# Patient Record
Sex: MALE | Race: WHITE
[De-identification: names, ages, dates, MRNs, and addresses within clinical notes are randomized per-mention and may not be internally consistent; named-entity substitution may affect disease eponyms.]

---

## 2017-03-22 ENCOUNTER — HOSPITAL ENCOUNTER (EMERGENCY)
Dept: HOSPITAL 1 - ED | Age: 37
Discharge: HOME | End: 2017-03-22
Payer: COMMERCIAL

## 2017-03-22 VITALS — DIASTOLIC BLOOD PRESSURE: 75 MMHG | SYSTOLIC BLOOD PRESSURE: 141 MMHG

## 2017-03-22 VITALS — HEIGHT: 68 IN | WEIGHT: 227.98 LBS | BODY MASS INDEX: 34.55 KG/M2

## 2017-03-22 DIAGNOSIS — R10.9: Primary | ICD-10-CM

## 2017-03-22 DIAGNOSIS — R19.7: ICD-10-CM

## 2017-03-22 LAB
ALBUMIN SERPL-MCNC: 3.6 G/DL (ref 3.4–5)
ALP SERPL-CCNC: 71 U/L (ref 46–116)
ALT SERPL-CCNC: 63 U/L (ref 16–63)
AST SERPL-CCNC: 97 U/L (ref 15–37)
BASOPHILS NFR BLD: 0.1 % (ref 0–2)
BILIRUB SERPL-MCNC: 1.4 MG/DL (ref 0.2–1)
BUN SERPL-MCNC: 14 MG/DL (ref 7–18)
CALCIUM SERPL-MCNC: 8.3 MG/DL (ref 8.5–10.1)
CHLORIDE SERPL-SCNC: 102 MMOL/L (ref 98–107)
CO2 SERPL-SCNC: 27 MMOL/L (ref 21–32)
CREAT SERPL-MCNC: 0.9 MG/DL (ref 0.7–1.3)
ERYTHROCYTE [DISTWIDTH] IN BLOOD BY AUTOMATED COUNT: 13.1 % (ref 11.5–14.5)
GFR SERPLBLD BASED ON 1.73 SQ M-ARVRAT: > 60 ML/MIN
GLUCOSE SERPL-MCNC: 111 MG/DL (ref 74–106)
LIPASE SERPL-CCNC: 55 IU/L (ref 73–393)
PLATELET # BLD: 249 X10^3MCL (ref 130–400)
POTASSIUM SERPL-SCNC: 4 MMOL/L (ref 3.5–5.1)
PROT SERPL-MCNC: 7.3 G/DL (ref 6.4–8.2)
SODIUM SERPL-SCNC: 139 MMOL/L (ref 136–145)

## 2017-10-22 ENCOUNTER — HOSPITAL ENCOUNTER (OUTPATIENT)
Dept: HOSPITAL 26 - MED | Age: 37
Setting detail: OBSERVATION
LOS: 1 days | Discharge: HOME | End: 2017-10-23
Payer: COMMERCIAL

## 2017-10-22 VITALS — DIASTOLIC BLOOD PRESSURE: 74 MMHG | SYSTOLIC BLOOD PRESSURE: 125 MMHG

## 2017-10-22 VITALS — DIASTOLIC BLOOD PRESSURE: 75 MMHG | SYSTOLIC BLOOD PRESSURE: 140 MMHG

## 2017-10-22 VITALS — DIASTOLIC BLOOD PRESSURE: 79 MMHG | SYSTOLIC BLOOD PRESSURE: 124 MMHG

## 2017-10-22 VITALS — SYSTOLIC BLOOD PRESSURE: 139 MMHG | DIASTOLIC BLOOD PRESSURE: 83 MMHG

## 2017-10-22 VITALS — WEIGHT: 260 LBS | HEIGHT: 65 IN | BODY MASS INDEX: 43.32 KG/M2

## 2017-10-22 DIAGNOSIS — J18.9: ICD-10-CM

## 2017-10-22 DIAGNOSIS — K56.609: Primary | ICD-10-CM

## 2017-10-22 DIAGNOSIS — R74.0: ICD-10-CM

## 2017-10-22 DIAGNOSIS — E66.01: ICD-10-CM

## 2017-10-22 LAB
ALBUMIN FLD-MCNC: 3.5 G/DL (ref 3.4–5)
AMYLASE SERPL-CCNC: 45 U/L (ref 25–115)
ANION GAP SERPL CALCULATED.3IONS-SCNC: 8.5 MMOL/L (ref 8–16)
AST SERPL-CCNC: 24 U/L (ref 15–37)
BASOPHILS # BLD AUTO: 0.2 K/UL (ref 0–0.22)
BASOPHILS NFR BLD AUTO: 2.7 % (ref 0–2)
BILIRUB SERPL-MCNC: 0.3 MG/DL (ref 0–1)
BUN SERPL-MCNC: 15 MG/DL (ref 7–18)
CHLORIDE SERPL-SCNC: 103 MMOL/L (ref 98–107)
CO2 SERPL-SCNC: 29.6 MMOL/L (ref 21–32)
CREAT SERPL-MCNC: 0.9 MG/DL (ref 0.7–1.3)
EOSINOPHIL # BLD AUTO: 0.1 K/UL (ref 0–0.4)
EOSINOPHIL NFR BLD AUTO: 2.1 % (ref 0–4)
ERYTHROCYTE [DISTWIDTH] IN BLOOD BY AUTOMATED COUNT: 12.4 % (ref 11.6–13.7)
GFR SERPL CREATININE-BSD FRML MDRD: 122 ML/MIN (ref 90–?)
GLUCOSE SERPL-MCNC: 106 MG/DL (ref 74–106)
HCT VFR BLD AUTO: 48.3 % (ref 36–52)
HGB BLD-MCNC: 16 G/DL (ref 12–18)
LDH SERPL-CCNC: 281 U/L (ref 85–227)
LIPASE SERPL-CCNC: 75 U/L (ref 73–393)
LIPASE SERPL-CCNC: 76 U/L (ref 73–393)
LYMPHOCYTES # BLD AUTO: 1 K/UL (ref 2–11.5)
LYMPHOCYTES NFR BLD AUTO: 15.9 % (ref 20.5–51.1)
MAGNESIUM SERPL-MCNC: 2 MG/DL (ref 1.8–2.4)
MCH RBC QN AUTO: 30 PG (ref 27–31)
MCHC RBC AUTO-ENTMCNC: 33 G/DL (ref 33–37)
MCV RBC AUTO: 92 FL (ref 80–94)
MONOCYTES # BLD AUTO: 0.9 K/UL (ref 0.8–1)
MONOCYTES NFR BLD AUTO: 15.2 % (ref 1.7–9.3)
NEUTROPHILS # BLD AUTO: 4 K/UL (ref 1.8–7.7)
NEUTROPHILS NFR BLD AUTO: 64.1 % (ref 42.2–75.2)
PHOSPHATE SERPL-MCNC: 3.8 MG/DL (ref 2.5–4.9)
PLATELET # BLD AUTO: 230 K/UL (ref 140–450)
POTASSIUM SERPL-SCNC: 4.1 MMOL/L (ref 3.5–5.1)
PROTHROMBIN TIME: 9.6 SECS (ref 10.8–13.4)
RBC # BLD AUTO: 5.27 MIL/UL (ref 4.2–6.1)
SODIUM SERPL-SCNC: 137 MMOL/L (ref 136–145)
T4 FREE SERPL-MCNC: 0.78 NG/DL (ref 0.76–1.46)
TSH SERPL DL<=0.05 MIU/L-ACNC: 1.34 UIU/ML (ref 0.34–3.74)
WBC # BLD AUTO: 6.2 K/UL (ref 4.8–10.8)

## 2017-10-22 PROCEDURE — 74176 CT ABD & PELVIS W/O CONTRAST: CPT

## 2017-10-22 PROCEDURE — 99285 EMERGENCY DEPT VISIT HI MDM: CPT

## 2017-10-22 PROCEDURE — G0378 HOSPITAL OBSERVATION PER HR: HCPCS

## 2017-10-22 PROCEDURE — 85610 PROTHROMBIN TIME: CPT

## 2017-10-22 PROCEDURE — 84100 ASSAY OF PHOSPHORUS: CPT

## 2017-10-22 PROCEDURE — 84443 ASSAY THYROID STIM HORMONE: CPT

## 2017-10-22 PROCEDURE — 85025 COMPLETE CBC W/AUTO DIFF WBC: CPT

## 2017-10-22 PROCEDURE — 87081 CULTURE SCREEN ONLY: CPT

## 2017-10-22 PROCEDURE — 83735 ASSAY OF MAGNESIUM: CPT

## 2017-10-22 PROCEDURE — 83036 HEMOGLOBIN GLYCOSYLATED A1C: CPT

## 2017-10-22 PROCEDURE — 82272 OCCULT BLD FECES 1-3 TESTS: CPT

## 2017-10-22 PROCEDURE — 96361 HYDRATE IV INFUSION ADD-ON: CPT

## 2017-10-22 PROCEDURE — 36415 COLL VENOUS BLD VENIPUNCTURE: CPT

## 2017-10-22 PROCEDURE — 80048 BASIC METABOLIC PNL TOTAL CA: CPT

## 2017-10-22 PROCEDURE — 87086 URINE CULTURE/COLONY COUNT: CPT

## 2017-10-22 PROCEDURE — 84439 ASSAY OF FREE THYROXINE: CPT

## 2017-10-22 PROCEDURE — 71010: CPT

## 2017-10-22 PROCEDURE — 83880 ASSAY OF NATRIURETIC PEPTIDE: CPT

## 2017-10-22 PROCEDURE — 81003 URINALYSIS AUTO W/O SCOPE: CPT

## 2017-10-22 PROCEDURE — 83615 LACTATE (LD) (LDH) ENZYME: CPT

## 2017-10-22 PROCEDURE — 80053 COMPREHEN METABOLIC PANEL: CPT

## 2017-10-22 PROCEDURE — 84484 ASSAY OF TROPONIN QUANT: CPT

## 2017-10-22 PROCEDURE — 84436 ASSAY OF TOTAL THYROXINE: CPT

## 2017-10-22 PROCEDURE — 96375 TX/PRO/DX INJ NEW DRUG ADDON: CPT

## 2017-10-22 PROCEDURE — 85730 THROMBOPLASTIN TIME PARTIAL: CPT

## 2017-10-22 PROCEDURE — 84479 ASSAY OF THYROID (T3 OR T4): CPT

## 2017-10-22 PROCEDURE — 74250 X-RAY XM SM INT 1CNTRST STD: CPT

## 2017-10-22 PROCEDURE — 83690 ASSAY OF LIPASE: CPT

## 2017-10-22 PROCEDURE — 93005 ELECTROCARDIOGRAM TRACING: CPT

## 2017-10-22 PROCEDURE — 82150 ASSAY OF AMYLASE: CPT

## 2017-10-22 PROCEDURE — 87177 OVA AND PARASITES SMEARS: CPT

## 2017-10-22 PROCEDURE — 96374 THER/PROPH/DIAG INJ IV PUSH: CPT

## 2017-10-22 PROCEDURE — 74000: CPT

## 2017-10-22 RX ADMIN — SODIUM CHLORIDE SCH MLS/HR: 9 INJECTION, SOLUTION INTRAVENOUS at 21:27

## 2017-10-22 RX ADMIN — SODIUM CHLORIDE SCH MLS/HR: 9 INJECTION, SOLUTION INTRAVENOUS at 12:23

## 2017-10-22 NOTE — NUR
RECEIVED PT FROM ER ASSISTED BY ER STAFF. PT AWAKE. ALERT ORIENTEDX4. NO SOB NOTED. 
COMPLAINS OF ABDOMINAL PAIN 8/10. SKIN INTACT, PT AMBULATORY BUT ON BEDREST. SAFETY 
PRECAUTION IN PLACE. CALL LIGHT WITHIN REACH.

## 2017-10-22 NOTE — NUR
37/M BIB MOTHER C/O  NAUSEA , DIARRHEA & GENERALIZED ABDOMINAL PAIN X 3 DAYS. 
PT STATES DIARRHEA X 3 EPISODES X TODAY.  SKIN IS PINK/WARM/DRY; AAOX4 WITH 
EVEN AND STEADY GAIT; LUNGS CLEAR BL; PATIENT STATES SHARP  PAIN OF 9/10 AT 
THIS TIME; PATIENT POSITIONED FOR COMFORT; HOB ELEVATED; BEDRAILS UP X2; BED 
DOWN. ER MD MADE AWARE OF PT STATUS.

-------------------------------------------------------------------------------

Addendum: 10/22/17 at 1105 by MED1

-------------------------------------------------------------------------------

REMOVES 2 ABDOMINAL POLYPS FOR 5 YEARS AGO FROM [facility].

## 2017-10-22 NOTE — NUR
INSTRUCTED PT TO NOT BEND ARM BECAUSE IV STOPS INFUSING, PT VERBALIZED UNDERSTANDING. PT IN 
STABLE CONDITION, NO SIGNS OF DISTRESS NOTED. NO C/O PAIN. BED IN LOW POSITION, CALL LIGHT 
WITHIN REACH. WILL CONTINUE TO MONITOR.

## 2017-10-22 NOTE — NUR
PT WALKED OUT OF ROOM TO NURSES STATION, PT GOT LIGHT HEADED. SAT PT DOWN ON CHAIR, WHEN 
ASKED WHY HE DIDNT USE CALL LIGHT PT STATED " I FORGOT, IM SORRY. I'M FINE NOW I CAN GO." 
INSTRUCTED PT TO STAY SEATED AND PUSHED PT BACK TO ROOM IN CHAIR. HELPED HIM TO BED AND 
INSTRUCTED HIM AGAIN ON USING CALL LIGHT. NO SIGNS OF DISTRESS NOTED, PT IN STABLE 
CONDITION. VS WNL. BED IN LOW POSITION, CALL LIGHT WITHIN REACH. WILL CONTINUE TO MONITOR.

## 2017-10-22 NOTE — NUR
Patient appears to be resting comfortably in bed. /89,P 73/MIN, 
Respirations even and unlabored.WILL CONTINUE TO MONITOR.

## 2017-10-22 NOTE — NUR
PT KEPT CLEAN, DRY AND COMFORTABLE. NEEDS ATTENDED. STOOL SPECIMEN FOR OCCULT BLOOD AND OVA 
PARASITE NOTE YET COLLECTED, PT'S LAST BM WAS AT HOME. URINE SPECIMEN FOR URINALYSIS NOT YET 
COLLECTED. WILL ENDORSE TO NEXT SHIFT. PT MAINTAINED ON NPO. NO SOB NOTED. DENIES ANY PAIN 
OR DISCOMFORT AT THIS TIME. FAMILY AT BEDSIDE. WILL ENDORSE TO NEXT SHIFT, PT ON STABLE 
CONDITION, FOR CONTINUITY OF CARE.

## 2017-10-22 NOTE — NUR
DR. HERNANDEZ MADE AWARE THAT PT WILL GO TO RADIOLOGY DEPT AND TO WILL JUST INSERT NGT WHEN PT 
COMES BACK.

## 2017-10-22 NOTE — NUR
XRAY TECH CAME TO DO XRAY OF NGT PLACEMENT. STOPPED INTERMITTENT SUCTION FOR NOW. AWAITING 
RESULT. PT STILL COMPLAINS OF FEELING UNCOMFORTABLE. WILL LET DR. HERNANDEZ KNOW.

## 2017-10-22 NOTE — NUR
PT CAME BACK FROM XRAY WITH XRAY TECH ALL. PT ON STABLE CONDITION NO SOB, DENIES PAIN AT 
THIS TIME. PER RADIO TECH HE WILL COME BACK AT 1800 AND 2200 FOR ANOTHER XRAY.

## 2017-10-22 NOTE — NUR
RECEIVED REPORT FROM DAY SHIFT RN, PT IS A/OX4, ON ROOM AIR. NO SOB NOTED. SKIN INTACT, 20G 
IV TO RIGHT AC INFUSING NS@100ML/HR. PT AMBULATORY BUT ON BEDREST. SAFETY PRECAUTION IN 
PLACE. UPDATED BOARD. DISCUSSED PLAN OF CARE WITH PT, PT VERBALIZED UNDERSTANDING. BED IN 
LOW POSITION, CALL LIGHT WITHIN REACH. WILL CONTINUE TO MONITOR.

## 2017-10-22 NOTE — NUR
PT STILL COMPLAINING OF FEELING UNCOMFORTABLE WITH HIS NGT. AND WOULD WANT TO REMOVE IT 
ALREADY WITHOUT WAITING FOR THE XRAY FOR PLACEMENT AND DOESN'T WANT TO WAIT FOR DR. HERNANDEZ 
TO SEE HIM. NGT WAS REMOVED. NO SOB NOTED.

## 2017-10-22 NOTE — NUR
Patient will be admitted to care of DR HERNANDEZ. Admited to TELE .  Will go to 
room [address]A. Belongings list completed.  Report to ALEX GERARD.

## 2017-10-22 NOTE — NUR
NGT INSERTED, USING ASEPTIC TECHNIQUE. POSITIVE GASTRIC SECRETION AND POSITIVE GURGLING 
SOUND NOTED. HOOKED TO INTERMITTENT SUCTION. GASTRIC SECRETION OF CLOUDY IN COLOR WITH FOOD 
PARTICLES NOTED ON SUCTION CONTAINER. PT COMPLAINS FEELING UNCOMFORTABLE. VITAL SIGNS TAKEN 
AND RECORDED AND STABLE. NO SOB NOTED. PT ABLE TO TALK. BUT VERBALIZING HE DOESN'T WANT THE 
NGT. AN ORDER FOR STAT XRAY WAS MADE BY DR. TAPIA. TO VERIFY PLACEMENT.

## 2017-10-23 VITALS — SYSTOLIC BLOOD PRESSURE: 139 MMHG | DIASTOLIC BLOOD PRESSURE: 81 MMHG

## 2017-10-23 VITALS — DIASTOLIC BLOOD PRESSURE: 81 MMHG | SYSTOLIC BLOOD PRESSURE: 127 MMHG

## 2017-10-23 VITALS — DIASTOLIC BLOOD PRESSURE: 75 MMHG | SYSTOLIC BLOOD PRESSURE: 125 MMHG

## 2017-10-23 VITALS — SYSTOLIC BLOOD PRESSURE: 139 MMHG | DIASTOLIC BLOOD PRESSURE: 79 MMHG

## 2017-10-23 VITALS — SYSTOLIC BLOOD PRESSURE: 134 MMHG | DIASTOLIC BLOOD PRESSURE: 89 MMHG

## 2017-10-23 LAB
ANION GAP SERPL CALCULATED.3IONS-SCNC: 8.8 MMOL/L (ref 8–16)
APPEARANCE UR: CLEAR
BASOPHILS # BLD AUTO: 0.2 K/UL (ref 0–0.22)
BASOPHILS NFR BLD AUTO: 3.8 % (ref 0–2)
BILIRUB UR QL STRIP: NEGATIVE
BUN SERPL-MCNC: 13 MG/DL (ref 7–18)
CHLORIDE SERPL-SCNC: 101 MMOL/L (ref 98–107)
CO2 SERPL-SCNC: 34.4 MMOL/L (ref 21–32)
COLOR UR: YELLOW
CREAT SERPL-MCNC: 1 MG/DL (ref 0.7–1.3)
EOSINOPHIL # BLD AUTO: 0.1 K/UL (ref 0–0.4)
EOSINOPHIL NFR BLD AUTO: 3 % (ref 0–4)
ERYTHROCYTE [DISTWIDTH] IN BLOOD BY AUTOMATED COUNT: 12.8 % (ref 11.6–13.7)
GFR SERPL CREATININE-BSD FRML MDRD: 108 ML/MIN (ref 90–?)
GLUCOSE SERPL-MCNC: 105 MG/DL (ref 74–106)
GLUCOSE UR STRIP-MCNC: NEGATIVE MG/DL
HCT VFR BLD AUTO: 49.5 % (ref 36–52)
HGB BLD-MCNC: 16.2 G/DL (ref 12–18)
HGB UR QL STRIP: NEGATIVE
LEUKOCYTE ESTERASE UR QL STRIP: NEGATIVE
LYMPHOCYTES # BLD AUTO: 0.9 K/UL (ref 2–11.5)
LYMPHOCYTES NFR BLD AUTO: 19.6 % (ref 20.5–51.1)
MAGNESIUM SERPL-MCNC: 2.3 MG/DL (ref 1.8–2.4)
MCH RBC QN AUTO: 30 PG (ref 27–31)
MCHC RBC AUTO-ENTMCNC: 33 G/DL (ref 33–37)
MCV RBC AUTO: 93 FL (ref 80–94)
MONOCYTES # BLD AUTO: 0.8 K/UL (ref 0.8–1)
MONOCYTES NFR BLD AUTO: 16.3 % (ref 1.7–9.3)
NEUTROPHILS # BLD AUTO: 2.7 K/UL (ref 1.8–7.7)
NEUTROPHILS NFR BLD AUTO: 57.3 % (ref 42.2–75.2)
NITRITE UR QL STRIP: NEGATIVE
PH UR STRIP: 6 [PH] (ref 5–9)
PHOSPHATE SERPL-MCNC: 4 MG/DL (ref 2.5–4.9)
PLATELET # BLD AUTO: 239 K/UL (ref 140–450)
POTASSIUM SERPL-SCNC: 4.2 MMOL/L (ref 3.5–5.1)
RBC # BLD AUTO: 5.32 MIL/UL (ref 4.2–6.1)
SODIUM SERPL-SCNC: 140 MMOL/L (ref 136–145)
WBC # BLD AUTO: 4.7 K/UL (ref 4.8–10.8)

## 2017-10-23 RX ADMIN — SODIUM CHLORIDE SCH MLS/HR: 9 INJECTION, SOLUTION INTRAVENOUS at 17:27

## 2017-10-23 RX ADMIN — SODIUM CHLORIDE SCH MLS/HR: 9 INJECTION, SOLUTION INTRAVENOUS at 07:27

## 2017-10-23 NOTE — NUR
PT VITAL SIGNS WNL. PT DENIES PAIN. PT IN STABLE CONDITION, NO SIGNS OF DISTRESS NOTED. BED 
IN LOW POSITION, CALL LIGHT WITHIN REACH. WILL CONTINUE TO MONITOR.

## 2017-10-23 NOTE — NUR
PATIENT HAS BEEN SCREENED AND CATEGORIZED AS HIGH NUTRITION RISK. PATIENT WILL BE SEEN 
WITHIN 1-2 DAYS OF ADMISSION.



10/22/17-10/23/17



FREDDIE COLLINS RD

## 2017-10-23 NOTE — NUR
PT SIGNED ALL DISCHARGE PAPERWORK, DISCHARGE EDUCATION GIVEN, FOLLOW UP INFORMATION GIVEN, 
IV REMOVED TIP INTACT, ALL PERSONAL BELONGINGS WITH PT. AWAITING RIDE

## 2017-10-23 NOTE — NUR
PT HAD BOWEL MOVEMENT. TOOK SAMPLE AND TOOK TO LAB. NO SIGNS OF DISTRESS NOTED. BED IN LOW 
POSITION, CALL LIGHT WITHIN REACH. WILL CONTINUE TO MONITOR.

## 2017-10-23 NOTE — NUR
PT ATE ALL LUNCH TOLERATED WELL, PT RODERICK ANY ABD PAIN OR NAUSEA. ALL NEEDS MET. WILL 
CONTINUE TO MONITOR.

## 2017-10-23 NOTE — NUR
10/23/17 

RD INITIAL ASSESSMENT COMPLETED



PLEASE REFER TO NUTRITION ASSESSMENT UNDER CARE ACTIVITY FOR ESTIMATED NUTRITIONAL NEEDS. 



1. CONTINUE REGULAR DIET

2. PROVIDE NUTRITION THERAPY EDUCATION AS NEEDED

3. RD TO FOLLOW-UP 2-3 DAYS, HIGH RISK 



FREDDIE COLLINS RD

## 2017-10-23 NOTE — NUR
RECEIVED REPORT FROM NIGHT NURSE, PT IS AAOX4, ON ROOM AIR, IV TO RIGHT AC 20G INFUSING 
WELL, SKIN INTACT, INITIAL ASSESSMENT COMPLETED, REVIEWED PLAN OF CARE WITH PT, PT 
VERBALIZED UNDERSTANDING. ALL SAFETY PRECAUTIONS MET. CALL LIGHT WITHIN REACH. WILL CONTINUE 
TO MONITOR.

## 2017-10-24 LAB
T3RU NFR SERPL: 24 % (ref 24–39)
T4 SERPL-MCNC: 5 UG/DL (ref 4.5–12)

## 2017-11-30 ENCOUNTER — HOSPITAL ENCOUNTER (EMERGENCY)
Dept: HOSPITAL 1 - ED | Age: 37
Discharge: HOME | End: 2017-11-30
Payer: MEDICAID

## 2017-11-30 VITALS — BODY MASS INDEX: 37.34 KG/M2 | WEIGHT: 246.37 LBS | HEIGHT: 68 IN

## 2017-11-30 VITALS — DIASTOLIC BLOOD PRESSURE: 80 MMHG | SYSTOLIC BLOOD PRESSURE: 139 MMHG

## 2017-11-30 DIAGNOSIS — H61.22: Primary | ICD-10-CM

## 2018-03-15 ENCOUNTER — HOSPITAL ENCOUNTER (EMERGENCY)
Dept: HOSPITAL 26 - MED | Age: 38
Discharge: HOME | End: 2018-03-15
Payer: COMMERCIAL

## 2018-03-15 VITALS — DIASTOLIC BLOOD PRESSURE: 75 MMHG | SYSTOLIC BLOOD PRESSURE: 145 MMHG

## 2018-03-15 VITALS — BODY MASS INDEX: 36.56 KG/M2 | WEIGHT: 241.25 LBS | HEIGHT: 68 IN

## 2018-03-15 VITALS — SYSTOLIC BLOOD PRESSURE: 145 MMHG | DIASTOLIC BLOOD PRESSURE: 75 MMHG

## 2018-03-15 DIAGNOSIS — R10.13: Primary | ICD-10-CM

## 2018-03-15 DIAGNOSIS — Z79.899: ICD-10-CM

## 2018-03-15 DIAGNOSIS — R11.0: ICD-10-CM

## 2018-03-15 RX ADMIN — LIDOCAINE HYDROCHLORIDE ONE ML: 20 SOLUTION ORAL at 18:30

## 2018-03-15 RX ADMIN — LIDOCAINE HYDROCHLORIDE ONE MG: 20 SOLUTION ORAL; TOPICAL at 18:30

## 2018-03-15 NOTE — NUR
PATIENT PRESENTS TO ED WITH C/O BURNING EPIGASTRIC PAIN WITH NAUSEA X3 DAYS, 
DENIES ETOH/SMOKING, DENIES ANY BLEEDING IN STOOLS. HX OF GERD, COLONSCOPY 5 
YEARS AGO.  PATIENT STATES PAIN OF 9/10 AT THIS TIME; VSS;  ER MD MADE AWARE OF 
PT STATUS.

## 2018-03-15 NOTE — NUR
Patient discharged with v/s stable. Written and verbal after care instructions 
given and explained. 

Patient alert, oriented and verbalized understanding of instructions. 
Ambulatory with steady gait. All questions addressed prior to discharge. ID 
band removed. Patient advised to follow up with PMD. Rx of MYLANTA, OMEPRAZOLE 
given. Patient educated on indication of medication including possible reaction 
and side effects. Opportunity to ask questions provided and answered.